# Patient Record
Sex: MALE | Race: BLACK OR AFRICAN AMERICAN | Employment: FULL TIME | URBAN - METROPOLITAN AREA
[De-identification: names, ages, dates, MRNs, and addresses within clinical notes are randomized per-mention and may not be internally consistent; named-entity substitution may affect disease eponyms.]

---

## 2017-02-07 ENCOUNTER — HOSPITAL ENCOUNTER (EMERGENCY)
Age: 42
Discharge: HOME | End: 2017-02-07
Attending: EMERGENCY MEDICINE
Payer: SELF-PAY

## 2017-02-07 ENCOUNTER — APPOINTMENT (OUTPATIENT)
Dept: CT IMAGING | Age: 42
End: 2017-02-07
Attending: PHYSICIAN ASSISTANT
Payer: SELF-PAY

## 2017-02-07 VITALS
HEART RATE: 78 BPM | DIASTOLIC BLOOD PRESSURE: 74 MMHG | SYSTOLIC BLOOD PRESSURE: 124 MMHG | RESPIRATION RATE: 18 BRPM | HEIGHT: 69 IN | BODY MASS INDEX: 26.66 KG/M2 | WEIGHT: 180 LBS | OXYGEN SATURATION: 98 % | TEMPERATURE: 98.7 F

## 2017-02-07 DIAGNOSIS — K11.1 PAROTID GLAND ENLARGEMENT: Primary | ICD-10-CM

## 2017-02-07 LAB
ANION GAP BLD CALC-SCNC: 18 MMOL/L (ref 5–15)
BASOPHILS # BLD AUTO: 0 K/UL (ref 0–0.1)
BASOPHILS # BLD: 0 % (ref 0–1)
BUN BLD-MCNC: 20 MG/DL (ref 9–20)
CA-I BLD-MCNC: 1.12 MMOL/L (ref 1.12–1.32)
CHLORIDE BLD-SCNC: 103 MMOL/L (ref 98–107)
CO2 BLD-SCNC: 23 MMOL/L (ref 21–32)
CREAT BLD-MCNC: 0.8 MG/DL (ref 0.6–1.3)
EOSINOPHIL # BLD: 0.1 K/UL (ref 0–0.4)
EOSINOPHIL NFR BLD: 1 % (ref 0–7)
ERYTHROCYTE [DISTWIDTH] IN BLOOD BY AUTOMATED COUNT: 13.8 % (ref 11.5–14.5)
GLUCOSE BLD-MCNC: 115 MG/DL (ref 75–110)
HCT VFR BLD AUTO: 40.8 % (ref 36.6–50.3)
HCT VFR BLD CALC: 45 % (ref 36.6–50.3)
HGB BLD-MCNC: 14 G/DL (ref 12.1–17)
HGB BLD-MCNC: 15.3 GM/DL (ref 12.1–17)
LYMPHOCYTES # BLD AUTO: 36 % (ref 12–49)
LYMPHOCYTES # BLD: 1.8 K/UL (ref 0.8–3.5)
MCH RBC QN AUTO: 28.8 PG (ref 26–34)
MCHC RBC AUTO-ENTMCNC: 34.3 G/DL (ref 30–36.5)
MCV RBC AUTO: 84 FL (ref 80–99)
MONOCYTES # BLD: 0.4 K/UL (ref 0–1)
MONOCYTES NFR BLD AUTO: 9 % (ref 5–13)
NEUTS SEG # BLD: 2.7 K/UL (ref 1.8–8)
NEUTS SEG NFR BLD AUTO: 54 % (ref 32–75)
PLATELET # BLD AUTO: 266 K/UL (ref 150–400)
POTASSIUM BLD-SCNC: 3.9 MMOL/L (ref 3.5–5.1)
RBC # BLD AUTO: 4.86 M/UL (ref 4.1–5.7)
SERVICE CMNT-IMP: ABNORMAL
SODIUM BLD-SCNC: 139 MMOL/L (ref 136–145)
WBC # BLD AUTO: 5 K/UL (ref 4.1–11.1)

## 2017-02-07 PROCEDURE — 70491 CT SOFT TISSUE NECK W/DYE: CPT

## 2017-02-07 PROCEDURE — 99284 EMERGENCY DEPT VISIT MOD MDM: CPT

## 2017-02-07 PROCEDURE — 85025 COMPLETE CBC W/AUTO DIFF WBC: CPT | Performed by: EMERGENCY MEDICINE

## 2017-02-07 PROCEDURE — 74011636320 HC RX REV CODE- 636/320: Performed by: EMERGENCY MEDICINE

## 2017-02-07 PROCEDURE — 80047 BASIC METABLC PNL IONIZED CA: CPT

## 2017-02-07 PROCEDURE — 36415 COLL VENOUS BLD VENIPUNCTURE: CPT | Performed by: EMERGENCY MEDICINE

## 2017-02-07 RX ORDER — SODIUM CHLORIDE 0.9 % (FLUSH) 0.9 %
5-10 SYRINGE (ML) INJECTION
Status: COMPLETED | OUTPATIENT
Start: 2017-02-07 | End: 2017-02-07

## 2017-02-07 RX ORDER — CEPHALEXIN 500 MG/1
500 CAPSULE ORAL 4 TIMES DAILY
Qty: 28 CAP | Refills: 0 | Status: SHIPPED | OUTPATIENT
Start: 2017-02-07 | End: 2017-02-14

## 2017-02-07 RX ADMIN — IOPAMIDOL 100 ML: 612 INJECTION, SOLUTION INTRAVENOUS at 13:30

## 2017-02-07 RX ADMIN — Medication 10 ML: at 13:30

## 2017-02-07 NOTE — PROGRESS NOTES
Care manager interviewed patient at bedside. RRAT: 4. The patient is a 46year old male seen in ED for cyst. Care management consult requested by provider to discuss PCP follow up. Verified patient's address and phone number. He lives in [facility] close to [facility], is employed but does not have insurance, [employer], or [facility]. No PCP. CM reviewed options for follow up care with patient and specifically recommended [facility] (reviewed instructions for Care Card application), [facility], [address], or [facility] or [facility] Prater). Patient receptive to all referral information and expressed intent to review further at home. Provided patient with CM contact information in case of questions or additional referral needs. Patient does not have any further questions today. Care Management Interventions  PCP Verified by CM: No (needs PCP referral)  Mode of Transport at Discharge: Self  Transition of Care Consult (CM Consult): Discharge Planning  Current Support Network:  Other  Confirm Follow Up Transport: Self  Plan discussed with Pt/Family/Caregiver: Yes (PCP)  Discharge Location  Discharge Placement: Home with outpatient services    AAMIR Tomlin  [phone]

## 2017-02-07 NOTE — ED NOTES
Patient reports swelling right side of face/jaw. Was placed on antibiotics, no improvement. Swelling started \"a couple months\" ago. Swallow intact. Reports no problems eating or drinking. No fevers. No pain, but \"slight discomfort\".

## 2017-02-07 NOTE — ED PROVIDER NOTES
HPI Comments: Pt with no significant PMHx presents complaining of right jaw mass x 3-4 months. Pt was seen at [facility] \"before thanksgiving\" and treated for possible abscess with Bactrim. Pt states the mass did not resolve. The mass has continued to grow in size, with no redness, warmth, or drainage. Pt denies fever/chills, weight loss. Patient is a 46 y.o. male presenting with cyst. The history is provided by the patient. Cyst    This is a recurrent problem. Episode onset: 3-4 months. There has been no fever. Affected Location: right jaw. The patient is experiencing no pain. Pertinent negatives include no blisters, no itching, no pain, no weeping and no hives. Treatments tried: bactrim. The treatment provided no relief. History reviewed. No pertinent past medical history. Past Surgical History:   Procedure Laterality Date    Hx heent       tonsilectomy at age 8         History reviewed. No pertinent family history. Social History     Social History    Marital status: SINGLE     Spouse name: N/A    Number of children: N/A    Years of education: N/A     Occupational History    Not on file. Social History Main Topics    Smoking status: Current Every Day Smoker     Packs/day: 0.50    Smokeless tobacco: Not on file    Alcohol use 4.2 oz/week     7 Cans of beer per week    Drug use: No    Sexual activity: Yes     Partners: Female     Other Topics Concern    Not on file     Social History Narrative    No narrative on file         ALLERGIES: Review of patient's allergies indicates no known allergies. Review of Systems   Constitutional: Negative for chills and fever. HENT: Negative for congestion, dental problem, drooling, facial swelling, mouth sores, postnasal drip, sore throat and trouble swallowing. Eyes: Negative for photophobia and visual disturbance. Respiratory: Negative for chest tightness and shortness of breath. Cardiovascular: Negative for chest pain. Gastrointestinal: Negative for abdominal pain, nausea and vomiting. Genitourinary: Negative for flank pain. Musculoskeletal: Negative for back pain and myalgias. Skin: Negative for color change, itching, pallor, rash and wound. \"Cyst like lesion\"   Neurological: Negative for dizziness, weakness, light-headedness and headaches. All other systems reviewed and are negative. Vitals:    02/07/17 1234 02/07/17 1300 02/07/17 1419 02/07/17 1424   BP: (!) 131/99 120/86  124/74   Pulse: 89 80 76 78   Resp: 16 16 16 18   Temp:       SpO2: 99% 98% 100% 98%   Weight:       Height:                Physical Exam   Constitutional: He is oriented to person, place, and time. He appears well-developed and well-nourished. No distress. HENT:   Head: Normocephalic and atraumatic. Large fluctuant mobile mass below the right ear. Non-erythematous,non-tender, with no drainage or pustular head. Unable to determine if lymph node. Below parotid gland. Eyes: Conjunctivae are normal.   Cardiovascular: Normal rate, regular rhythm and normal heart sounds. Pulmonary/Chest: Effort normal and breath sounds normal. No respiratory distress. Neurological: He is alert and oriented to person, place, and time. No cranial nerve deficit. Skin: Skin is warm. No rash noted. Psychiatric: He has a normal mood and affect. His behavior is normal.   Nursing note and vitals reviewed. MDM  Number of Diagnoses or Management Options  Parotid gland enlargement:   Diagnosis management comments: DDx: Abscess, Cyst, Parotid Neoplasm, Parotitis        Amount and/or Complexity of Data Reviewed  Clinical lab tests: ordered and reviewed  Tests in the radiology section of CPT®: ordered and reviewed      ED Course       Procedures        Chief Complaint   Patient presents with    Cyst     right lower jaw x 2 months.            MEDICATIONS GIVEN:  Medications   iopamidol (ISOVUE 300) 61 % contrast injection 100 mL (100 mL IntraVENous Given 2/7/17 1330)   sodium chloride (NS) flush 5-10 mL (10 mL IntraVENous Given 2/7/17 1330)       LABS REVIEWED:  Labs Reviewed   POC CHEM8 - Abnormal; Notable for the following:        Result Value    Anion gap (POC) 18 (*)     Glucose (POC) 115 (*)     All other components within normal limits   CBC WITH AUTOMATED DIFF   CBC WITH AUTOMATED DIFF   POC CHEM8       RADIOLOGY RESULTS:  The following have been ordered and reviewed:  EXAM: CT NECK SOFT TISSUE W CONT     INDICATION: Right lower jaw swelling for 2 months.     COMPARISON: None     TECHNIQUE: Helical neck CT is performed with intravenous contrast. Coronal and  sagittal reformatted images are obtained. CT dose reduction was achieved  through use of a standardized protocol tailored for this examination and  automatic exposure control for dose modulation.     FINDINGS:   There is a heterogeneous mass in the right parotid gland with cystic areas  measuring 3.8 x 3.8 x 4.7 cm. No salivary gland stone or calcification is  demonstrated.     There are several prominent to mildly enlarged lymph nodes in the right neck  including a right submandibular lymph node measuring 0.9 x 2.2 cm (series 3,  image 48). A representative right internal jugular lymph node measures 1.1 x 1.6  cm (series 3, image 45).    The visualized paranasal sinuses are clear. The nasopharynx, oropharynx,  hypopharynx and larynx are normal. There is limitation in evaluating the oral  cavity, although grossly the oral tongue, buccal spaces and floor of mouth are  normal.      The left parotid, bilateral submandibular, and thyroid glands are unremarkable.     Limited intracranial images are grossly normal. The globes and orbits are  symmetric and within normal limits. There are biapical blebs in the lung apices. There is no lung mass. There is no aggressive blastic or lytic bony lesion.     IMPRESSION  IMPRESSION:  1.  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 Further evaluation with biopsy is recommended, which  can likely be performed with ultrasound guidance.     2. Mildly enlarged right neck lymph nodes may be reactive with metastatic  disease not excluded. PROGRESS NOTES:  Discussed results and imaging with patients. Stressed importance of prompt follow up. All questions answered. Care manager discussed health care and PCP options with pt.      IMPRESSION:  1- Parotid gland enlargement    PLAN:  1- Keflex  Given copy of imaging  Follow up ENT

## 2017-02-07 NOTE — ED NOTES
Patient given copy of discharge instructions and 1 script(s). Patient given a current medication reconciliation form and verbalized understanding of their medications and importance of discussing medications at follow-up. Patient stable at discharge. Ambulatory out of ED with family.

## 2017-02-07 NOTE — ED NOTES
Emergency Department Nursing Plan of Care       The Nursing Plan of Care is developed from the Nursing assessment and Emergency Department Attending provider initial evaluation. The plan of care may be reviewed in the ED Provider note.     The Plan of Care was developed with the following considerations:   Patient / Family readiness to learn indicated by:verbalized understanding  Persons(s) to be included in education: patient  Barriers to Learning/Limitations:No    Signed     Josh Brown RN    2/7/2017   2:44 PM